# Patient Record
Sex: MALE | Race: WHITE | NOT HISPANIC OR LATINO | Employment: FULL TIME | URBAN - METROPOLITAN AREA
[De-identification: names, ages, dates, MRNs, and addresses within clinical notes are randomized per-mention and may not be internally consistent; named-entity substitution may affect disease eponyms.]

---

## 2017-04-03 ENCOUNTER — HOSPITAL ENCOUNTER (EMERGENCY)
Facility: MEDICAL CENTER | Age: 52
End: 2017-04-03
Attending: EMERGENCY MEDICINE
Payer: COMMERCIAL

## 2017-04-03 VITALS
WEIGHT: 184.3 LBS | HEIGHT: 67 IN | HEART RATE: 89 BPM | SYSTOLIC BLOOD PRESSURE: 142 MMHG | OXYGEN SATURATION: 99 % | BODY MASS INDEX: 28.93 KG/M2 | RESPIRATION RATE: 16 BRPM | TEMPERATURE: 96.8 F | DIASTOLIC BLOOD PRESSURE: 98 MMHG

## 2017-04-03 DIAGNOSIS — R04.0 EPISTAXIS: ICD-10-CM

## 2017-04-03 PROCEDURE — 99283 EMERGENCY DEPT VISIT LOW MDM: CPT

## 2017-04-03 PROCEDURE — 700102 HCHG RX REV CODE 250 W/ 637 OVERRIDE(OP): Performed by: EMERGENCY MEDICINE

## 2017-04-03 RX ORDER — OXYMETAZOLINE HYDROCHLORIDE 0.05 G/100ML
2 SPRAY NASAL ONCE
Status: COMPLETED | OUTPATIENT
Start: 2017-04-03 | End: 2017-04-03

## 2017-04-03 RX ORDER — OXYMETAZOLINE HYDROCHLORIDE 0.05 G/100ML
2 SPRAY NASAL ONCE
Status: DISCONTINUED | OUTPATIENT
Start: 2017-04-03 | End: 2017-04-03

## 2017-04-03 RX ADMIN — OXYMETAZOLINE HYDROCHLORIDE 2 SPRAY: 5 SPRAY NASAL at 02:45

## 2017-04-03 ASSESSMENT — PAIN SCALES - GENERAL: PAINLEVEL_OUTOF10: 0

## 2017-04-03 ASSESSMENT — LIFESTYLE VARIABLES: DO YOU DRINK ALCOHOL: NO

## 2017-04-03 NOTE — ED AVS SNAPSHOT
4/3/2017          Rhys Canseco  [address]  [place] [address]    Dear Rhys:    [facility] wants to ensure your discharge home is safe and you or your loved ones have had all your questions answered regarding your care after you leave the hospital.    You may receive a telephone call within two days of your discharge.  This call is to make certain you understand your discharge instructions as well as ensure we provided you with the best care possible during your stay with us.     The call will only last approximately 3-5 minutes and will be done by a nurse.    Once again, we want to ensure your discharge home is safe and that you have a clear understanding of any next steps in your care.  If you have any questions or concerns, please do not hesitate to contact us, we are here for you.  Thank you for choosing [facility] for your healthcare needs.    Sincerely,    Mamadou Batres    [facility]

## 2017-04-03 NOTE — ED NOTES
Discharge instructions given to patient, a verbal understanding of all instructions was stated.Pt preferred to walk out accompanied by wife VSS, all belongings accounted for.

## 2017-04-03 NOTE — ED PROVIDER NOTES
"CHIEF COMPLAINT  Chief Complaint   Patient presents with   • Epistaxis       HPI  Rhys Canseco is a 51 y.o. male who presents with epistaxis which started this evening. Awoke with it. Reports that he has been struggling with epistaxis for most of his life. No history of blood thinning agents. No prior surgical interventions. Denies history of trauma. Denies any vomiting. Reports that it started initially out of his right nostril down his left. Attempted hemostasis with packing using paper towels.    REVIEW OF SYSTEMS  See HPI for further details. All other systems are negative.     PAST MEDICAL HISTORY   has a past medical history of Gallstone.    SOCIAL HISTORY  Social History     Social History Main Topics   • Smoking status: Current Every Day Smoker -- 0.75 packs/day     Types: Cigarettes   • Smokeless tobacco: Never Used      Comment: 1/2 pack a day   • Alcohol Use: No   • Drug Use: Yes     Special: Inhaled      Comment: marijuana   • Sexual Activity: Not on file       SURGICAL HISTORY   has past surgical history that includes appendectomy and sonia by laparoscopy.    CURRENT MEDICATIONS  Home Medications     Reviewed by Shelia Castro R.N. (Registered Nurse) on 04/03/17 at 0214  Med List Status: Not Addressed    Medication Last Dose Status          Patient Robert Taking any Medications                        ALLERGIES  No Known Allergies    PHYSICAL EXAM  VITAL SIGNS: /96 mmHg  Pulse 88  Temp(Src) 36 °C (96.8 °F)  Resp 17  Ht 1.702 m (5' 7\")  Wt 83.6 kg (184 lb 4.9 oz)  BMI 28.86 kg/m2  SpO2 100%  Pulse ox interpretation: I interpret this pulse ox as normal.  Constitutional: Alert in no apparent distress.  HENT: No signs of trauma, Bilateral external ears normal, dried blood in bilateral nares. Patient had been applying pressure however no active bleeding at this time. Posterior pharynx without obvious blood  Eyes: Pupils are equal and reactive, Conjunctiva normal, Non-icteric.   Cardiovascular: " "Regular rate and rhythm, no murmurs.   Thorax & Lungs: No respiratory distress  Skin: Warm, Dry, No erythema, No rash.   Extremities: Intact distal pulses, No edema, No tenderness, No cyanosis  Psychiatric: Affect normal, Judgment normal, Mood normal.       DIAGNOSTIC STUDIES / PROCEDURES    COURSE & MEDICAL DECISION MAKING  Pertinent Labs & Imaging studies reviewed. (See chart for details)  51-year-old male presenting with epistaxis. Not on blood thinning agents. Has a history of epistaxis in the past. Denies any trauma. Bleeding was controlled with direct pressure and dose of oxymetazoline. Patient was instructed to follow-up with ENT as needed and also given clear instructions on how to manage epistaxis at home. He likely not adequately manage his epistaxis given that he simply plugged it with paper towels. Dry air likely contributing to his epistaxis.    The patient will not drink alcohol nor drive with prescribed medications.   The patient will return for worsening symptoms or failure of improvement and is stable at the time of discharge. The patient verbalizes understanding in their own words.    Pcp Pt States None    In 2 days      [facility], Emergency Dept  [address] [place] [address] [phone]    As needed, If symptoms worsen    Petty Rowe M.D.  [address]  [address]  [facility] [id number] [phone]      As needed for ENT evaluation    /96 mmHg  Pulse 88  Temp(Src) 36 °C (96.8 °F)  Resp 17  Ht 1.702 m (5' 7\")  Wt 83.6 kg (184 lb 4.9 oz)  BMI 28.86 kg/m2  SpO2 100%    The patient was referred to primary care where they will receive further BP management.        FINAL IMPRESSION  1. Epistaxis            Electronically signed by: Vijay Zacarias, 4/3/2017 2:19 AM      "

## 2017-04-03 NOTE — ED AVS SNAPSHOT
Home Care Instructions                                                                                                                Rhys Canseco   MRN: [id number]    Department:  [facility], Emergency Dept   Date of Visit:  4/3/2017            [facility], Emergency Dept    [address] [address]    Phone:  [phone]      You were seen by     Vijay Zacarias M.D.      Your Diagnosis Was     Epistaxis     R04.0       Follow-up Information     1. Follow up with Pcp Pt States None In 2 days.    Specialty:  Family Medicine        2. Follow up with [facility], Emergency Dept.    Specialty:  Emergency Medicine    Why:  As needed, If symptoms worsen    Contact information    [address] [facility] [address] [phone]        3. Follow up with Petty Rowe M.D..    Specialty:  Otolaryngology    Why:  As needed for ENT evaluation    Contact information    [address]  [address]  [facility] [address] [phone]        Medication Information     Review all of your home medications and newly ordered medications with your primary doctor and/or pharmacist as soon as possible. Follow medication instructions as directed by your doctor and/or pharmacist.     Please keep your complete medication list with you and share with your physician. Update the information when medications are discontinued, doses are changed, or new medications (including over-the-counter products) are added; and carry medication information at all times in the event of emergency situations.               Medication List      Notice     You have not been prescribed any medications.              Discharge Instructions       Nosebleed  Nosebleeds are common. They are due to a crack in the inside lining of your nose (mucous membrane) or from a small blood vessel that starts to bleed. Nosebleeds can be caused by many conditions, such as injury, infections, dry mucous membranes or  dry climate, medicines, nose picking, and home heating and cooling systems. Most nosebleeds come from blood vessels in the front of your nose.  HOME CARE INSTRUCTIONS   · Try controlling your nosebleed by pinching your nostrils gently and continuously for at least 10 minutes.  · Avoid blowing or sniffing your nose for a number of hours after having a nosebleed.  · Do not put gauze inside your nose yourself. If your nose was packed by your health care provider, try to maintain the pack inside of your nose until your health care provider removes it.  ¨ If a gauze pack was used and it starts to fall out, gently replace it or cut off the end of it.  ¨ If a balloon catheter was used to pack your nose, do not cut or remove it unless your health care provider has instructed you to do that.  · Avoid lying down while you are having a nosebleed. Sit up and lean forward.  · Use a nasal spray decongestant to help with a nosebleed as directed by your health care provider.  · Do not use petroleum jelly or mineral oil in your nose. These can drip into your lungs.  · Maintain humidity in your home by using less air conditioning or by using a humidifier.  · Aspirin and blood thinners make bleeding more likely. If you are prescribed these medicines and you suffer from nosebleeds, ask your health care provider if you should stop taking the medicines or adjust the dose. Do not stop medicines unless directed by your health care provider  · Resume your normal activities as you are able, but avoid straining, lifting, or bending at the waist for several days.  · If your nosebleed was caused by dry mucous membranes, use over-the-counter saline nasal spray or gel. This will keep the mucous membranes moist and allow them to heal. If you must use a lubricant, choose the water-soluble variety. Use it only sparingly, and do not use it within several hours of lying down.  · Keep all follow-up visits as directed by your health care provider. This  is important.  SEEK MEDICAL CARE IF:  · You have a fever.  · You get frequent nosebleeds.  · You are getting nosebleeds more often.  SEEK IMMEDIATE MEDICAL CARE IF:  · Your nosebleed lasts longer than 20 minutes.  · Your nosebleed occurs after an injury to your face, and your nose looks crooked or broken.  · You have unusual bleeding from other parts of your body.  · You have unusual bruising on other parts of your body.  · You feel light-headed or you faint.  · You become sweaty.  · You vomit blood.  · Your nosebleed occurs after a head injury.     This information is not intended to replace advice given to you by your health care provider. Make sure you discuss any questions you have with your health care provider.     Document Released: 09/27/2006 Document Revised: 01/08/2016 Document Reviewed: 08/03/2015  [employer] Interactive Patient Education ©2016 [employer]            Patient Information     Patient Information    Following emergency treatment: all patient requiring follow-up care must return either to a private physician or a clinic if your condition worsens before you are able to obtain further medical attention, please return to the emergency room.     Billing Information    At [facility], we work to make the billing process streamlined for our patients.  Our Representatives are here to answer any questions you may have regarding your hospital bill.  If you have insurance coverage and have supplied your insurance information to us, we will submit a claim to your insurer on your behalf.  Should you have any questions regarding your bill, we can be reached online or by phone as follows:  Online: You are able pay your bills online or live chat with our representatives about any billing questions you may have. We are here to help Monday - Friday from 8:00am to 7:30pm and 9:00am - 12:00pm on Saturdays.  Please visit [email or web address]  for more information.   Phone:   [phone] or [phone]    Please note that your emergency physician, surgeon, pathologist, radiologist, anesthesiologist, and other specialists are not employed by [facility] and will therefore bill separately for their services.  Please contact them directly for any questions concerning their bills at the numbers below:     Emergency Physician Services:  [phone]  [facility]:  [phone]  [facility]:  [phone]  [facility]:  [phone]    1. Your final bill may vary from the amount quoted upon discharge if all procedures are not complete at that time, or if your doctor has additional procedures of which we are not aware. You will receive an additional bill if you return to the Emergency Department at [facility] for suture removal regardless of the facility of which the sutures were placed.     2. Please arrange for settlement of this account at the emergency registration.    3. All self-pay accounts are due in full at the time of treatment.  If you are unable to meet this obligation then payment is expected within 4-5 days.     4. If you have had radiology studies (CT, X-ray, Ultrasound, MRI), you have received a preliminary result during your emergency department visit. Please contact the radiology department [phone] to receive a copy of your final result. Please discuss the Final result with your primary physician or with the follow up physician provided.     Crisis Hotline:  [place] Crisis Hotline:  7-613-CBVIPDN or 1-724.260.8080  [place] Crisis Hotline:    1-551.922.5066 or 281-106-2573         ED Discharge Follow Up Questions    1. In order to provide you with very good care, we would like to follow up with a phone call in the next few days.  May we have your permission to contact you?     YES /  NO    2. What is the best phone number to call you? (       )_____-__________    3. What is the best time to call you?      Morning  /   Afternoon  /  Evening                   Patient Signature:  ____________________________________________________________    Date:  ____________________________________________________________

## 2017-04-03 NOTE — DISCHARGE INSTRUCTIONS
Nosebleed  Nosebleeds are common. They are due to a crack in the inside lining of your nose (mucous membrane) or from a small blood vessel that starts to bleed. Nosebleeds can be caused by many conditions, such as injury, infections, dry mucous membranes or dry climate, medicines, nose picking, and home heating and cooling systems. Most nosebleeds come from blood vessels in the front of your nose.  HOME CARE INSTRUCTIONS   · Try controlling your nosebleed by pinching your nostrils gently and continuously for at least 10 minutes.  · Avoid blowing or sniffing your nose for a number of hours after having a nosebleed.  · Do not put gauze inside your nose yourself. If your nose was packed by your health care provider, try to maintain the pack inside of your nose until your health care provider removes it.  ¨ If a gauze pack was used and it starts to fall out, gently replace it or cut off the end of it.  ¨ If a balloon catheter was used to pack your nose, do not cut or remove it unless your health care provider has instructed you to do that.  · Avoid lying down while you are having a nosebleed. Sit up and lean forward.  · Use a nasal spray decongestant to help with a nosebleed as directed by your health care provider.  · Do not use petroleum jelly or mineral oil in your nose. These can drip into your lungs.  · Maintain humidity in your home by using less air conditioning or by using a humidifier.  · Aspirin and blood thinners make bleeding more likely. If you are prescribed these medicines and you suffer from nosebleeds, ask your health care provider if you should stop taking the medicines or adjust the dose. Do not stop medicines unless directed by your health care provider  · Resume your normal activities as you are able, but avoid straining, lifting, or bending at the waist for several days.  · If your nosebleed was caused by dry mucous membranes, use over-the-counter saline nasal spray or gel. This will keep the  mucous membranes moist and allow them to heal. If you must use a lubricant, choose the water-soluble variety. Use it only sparingly, and do not use it within several hours of lying down.  · Keep all follow-up visits as directed by your health care provider. This is important.  SEEK MEDICAL CARE IF:  · You have a fever.  · You get frequent nosebleeds.  · You are getting nosebleeds more often.  SEEK IMMEDIATE MEDICAL CARE IF:  · Your nosebleed lasts longer than 20 minutes.  · Your nosebleed occurs after an injury to your face, and your nose looks crooked or broken.  · You have unusual bleeding from other parts of your body.  · You have unusual bruising on other parts of your body.  · You feel light-headed or you faint.  · You become sweaty.  · You vomit blood.  · Your nosebleed occurs after a head injury.     This information is not intended to replace advice given to you by your health care provider. Make sure you discuss any questions you have with your health care provider.     Document Released: 09/27/2006 Document Revised: 01/08/2016 Document Reviewed: 08/03/2015  "[employer]" Interactive Patient Education ©2016 "[employer] Inc.

## 2017-04-03 NOTE — ED NOTES
Patient to ED triage with complaint of nose bleed x30 minutes. States he has not been able to control bleeding at home. Nasal clamp applied and he continues to have oozing. Denies anticoagulant use. No home 02.     Pt educated on ED process and asked to wait in lobby. Patient educated on importance of alerting staff to new or worsening symptoms or concerns.

## 2017-04-03 NOTE — ED NOTES
"Pt's nose has stopped bleeding at this time. Pt. Has scant drainage left. Pt. Provided nose clamps to take home and emesis bag to cough up what he states is \"a bunch of blood in the back of his throat.\"  "

## 2017-04-03 NOTE — ED AVS SNAPSHOT
BioMed[employer] Access Code: [email or web address]  Expires: 5/3/2017  3:33 AM    BioMedFlex  A secure, online tool to manage your health information     [employer]’s BioMedFlex® is a secure, online tool that connects you to your personalized health information from the privacy of your home -- day or night - making it very easy for you to manage your healthcare. Once the activation process is completed, you can even access your medical information using the BioMedFlex jennifer, which is available for free in the Apple Jennifer store or Google Play store.     BioMedFlex provides the following levels of access (as shown below):   My Chart Features   [facility] Primary Care Doctor [facility]  Specialists [facility]  Urgent  Care Non-[facility]  Primary Care  Doctor   Email your healthcare team securely and privately 24/7 X X X X   Manage appointments: schedule your next appointment; view details of past/upcoming appointments X      Request prescription refills. X      View recent personal medical records, including lab and immunizations X X X X   View health record, including health history, allergies, medications X X X X   Read reports about your outpatient visits, procedures, consult and ER notes X X X X   See your discharge summary, which is a recap of your hospital and/or ER visit that includes your diagnosis, lab results, and care plan. X X       How to register for BioMedFlex:  1. Go to  [email or web address].  2. Click on the Sign Up Now box, which takes you to the New Member Sign Up page. You will need to provide the following information:  a. Enter your BioMedFlex Access Code exactly as it appears at the top of this page. (You will not need to use this code after you’ve completed the sign-up process. If you do not sign up before the expiration date, you must request a new code.)   b. Enter your date of birth.   c. Enter your home email address.   d. Click Submit, and follow the next screen’s instructions.  3. Create a BioMedFlex ID. This will be your [employer]  login ID and cannot be changed, so think of one that is secure and easy to remember.  4. Create a [employer] password. You can change your password at any time.  5. Enter your Password Reset Question and Answer. This can be used at a later time if you forget your password.   6. Enter your e-mail address. This allows you to receive e-mail notifications when new information is available in [employer].  7. Click Sign Up. You can now view your health information.    For assistance activating your [employer] account, call [phone]

## 2019-06-26 ENCOUNTER — HOSPITAL ENCOUNTER (EMERGENCY)
Facility: MEDICAL CENTER | Age: 54
End: 2019-06-26
Attending: EMERGENCY MEDICINE

## 2019-06-26 ENCOUNTER — APPOINTMENT (OUTPATIENT)
Dept: RADIOLOGY | Facility: MEDICAL CENTER | Age: 54
End: 2019-06-26
Attending: EMERGENCY MEDICINE

## 2019-06-26 VITALS
WEIGHT: 190.04 LBS | HEART RATE: 68 BPM | RESPIRATION RATE: 18 BRPM | HEIGHT: 67 IN | SYSTOLIC BLOOD PRESSURE: 109 MMHG | BODY MASS INDEX: 29.83 KG/M2 | TEMPERATURE: 96.7 F | DIASTOLIC BLOOD PRESSURE: 63 MMHG | OXYGEN SATURATION: 99 %

## 2019-06-26 DIAGNOSIS — R07.89 ATYPICAL CHEST PAIN: ICD-10-CM

## 2019-06-26 LAB
ALBUMIN SERPL BCP-MCNC: 4.4 G/DL (ref 3.2–4.9)
ALBUMIN/GLOB SERPL: 1.8 G/DL
ALP SERPL-CCNC: 64 U/L (ref 30–99)
ALT SERPL-CCNC: 27 U/L (ref 2–50)
ANION GAP SERPL CALC-SCNC: 16 MMOL/L (ref 0–11.9)
AST SERPL-CCNC: 20 U/L (ref 12–45)
BASOPHILS # BLD AUTO: 0.9 % (ref 0–1.8)
BASOPHILS # BLD: 0.09 K/UL (ref 0–0.12)
BILIRUB SERPL-MCNC: 0.3 MG/DL (ref 0.1–1.5)
BUN SERPL-MCNC: 20 MG/DL (ref 8–22)
CALCIUM SERPL-MCNC: 9.5 MG/DL (ref 8.5–10.5)
CHLORIDE SERPL-SCNC: 104 MMOL/L (ref 96–112)
CO2 SERPL-SCNC: 20 MMOL/L (ref 20–33)
CREAT SERPL-MCNC: 1.07 MG/DL (ref 0.5–1.4)
EKG IMPRESSION: NORMAL
EOSINOPHIL # BLD AUTO: 0.49 K/UL (ref 0–0.51)
EOSINOPHIL NFR BLD: 4.8 % (ref 0–6.9)
ERYTHROCYTE [DISTWIDTH] IN BLOOD BY AUTOMATED COUNT: 39.5 FL (ref 35.9–50)
GLOBULIN SER CALC-MCNC: 2.5 G/DL (ref 1.9–3.5)
GLUCOSE SERPL-MCNC: 110 MG/DL (ref 65–99)
HCT VFR BLD AUTO: 48.3 % (ref 42–52)
HGB BLD-MCNC: 16.7 G/DL (ref 14–18)
IMM GRANULOCYTES # BLD AUTO: 0.04 K/UL (ref 0–0.11)
IMM GRANULOCYTES NFR BLD AUTO: 0.4 % (ref 0–0.9)
LYMPHOCYTES # BLD AUTO: 4.21 K/UL (ref 1–4.8)
LYMPHOCYTES NFR BLD: 41.1 % (ref 22–41)
MCH RBC QN AUTO: 30.8 PG (ref 27–33)
MCHC RBC AUTO-ENTMCNC: 34.6 G/DL (ref 33.7–35.3)
MCV RBC AUTO: 89.1 FL (ref 81.4–97.8)
MONOCYTES # BLD AUTO: 0.94 K/UL (ref 0–0.85)
MONOCYTES NFR BLD AUTO: 9.2 % (ref 0–13.4)
NEUTROPHILS # BLD AUTO: 4.48 K/UL (ref 1.82–7.42)
NEUTROPHILS NFR BLD: 43.6 % (ref 44–72)
NRBC # BLD AUTO: 0 K/UL
NRBC BLD-RTO: 0 /100 WBC
PLATELET # BLD AUTO: 261 K/UL (ref 164–446)
PMV BLD AUTO: 10.9 FL (ref 9–12.9)
POTASSIUM SERPL-SCNC: 3.6 MMOL/L (ref 3.6–5.5)
PROT SERPL-MCNC: 6.9 G/DL (ref 6–8.2)
RBC # BLD AUTO: 5.42 M/UL (ref 4.7–6.1)
SODIUM SERPL-SCNC: 140 MMOL/L (ref 135–145)
TROPONIN I SERPL-MCNC: <0.01 NG/ML (ref 0–0.04)
WBC # BLD AUTO: 10.3 K/UL (ref 4.8–10.8)

## 2019-06-26 PROCEDURE — 84484 ASSAY OF TROPONIN QUANT: CPT

## 2019-06-26 PROCEDURE — 93005 ELECTROCARDIOGRAM TRACING: CPT

## 2019-06-26 PROCEDURE — 71045 X-RAY EXAM CHEST 1 VIEW: CPT

## 2019-06-26 PROCEDURE — 85025 COMPLETE CBC W/AUTO DIFF WBC: CPT

## 2019-06-26 PROCEDURE — 304561 HCHG STAT O2

## 2019-06-26 PROCEDURE — 99284 EMERGENCY DEPT VISIT MOD MDM: CPT

## 2019-06-26 PROCEDURE — 36415 COLL VENOUS BLD VENIPUNCTURE: CPT

## 2019-06-26 PROCEDURE — 700111 HCHG RX REV CODE 636 W/ 250 OVERRIDE (IP): Performed by: EMERGENCY MEDICINE

## 2019-06-26 PROCEDURE — 96374 THER/PROPH/DIAG INJ IV PUSH: CPT

## 2019-06-26 PROCEDURE — 80053 COMPREHEN METABOLIC PANEL: CPT

## 2019-06-26 PROCEDURE — 93005 ELECTROCARDIOGRAM TRACING: CPT | Performed by: EMERGENCY MEDICINE

## 2019-06-26 RX ORDER — LORAZEPAM 2 MG/ML
0.5 INJECTION INTRAMUSCULAR ONCE
Status: COMPLETED | OUTPATIENT
Start: 2019-06-26 | End: 2019-06-26

## 2019-06-26 RX ADMIN — LORAZEPAM 0.5 MG: 2 INJECTION INTRAMUSCULAR; INTRAVENOUS at 08:32

## 2019-06-26 ASSESSMENT — PAIN DESCRIPTION - DESCRIPTORS: DESCRIPTORS: SHARP

## 2019-06-26 NOTE — ED NOTES
Pt brought back to rm GR 25 from triage by WC. Agree with triage note, pt instructed to slow his breathing. Pt able to transfer self to bed, pt in gown, on monitor, PIV line established. Blood drawn and sent to the lab. Call light in reach. Chart up for ERP.

## 2019-06-26 NOTE — ED TRIAGE NOTES
".  Chief Complaint   Patient presents with   • Chest Pain     ./84   Pulse 92   Temp 35.9 °C (96.7 °F) (Temporal)   Resp (!) 24 Comment: Pt coached to slow down his breathing.   Ht 1.702 m (5' 7\")   Wt 86.2 kg (190 lb 0.6 oz)   SpO2 100%   BMI 29.76 kg/m²     Ambulatory to triage with above complaints, patient woke up with 8/10 sharp chest pain radiating to back approximately 1 hour ago, SOB, EKG done, educated on triage process, placed in lobby, told to inform staff of any changes in condition.    "

## 2019-06-26 NOTE — ED PROVIDER NOTES
"ED Provider Note    Scribed for Jamar Winter M.D. by Jamar Winter. 6/26/2019,  8:17 AM.    CHIEF COMPLAINT  Chief Complaint   Patient presents with   • Chest Pain       HPI  Rhys Canseco is an intensely anxious 53 y.o. male who presents to the Emergency Department complaining of 2 hours of sharp left-sided chest pain, which is now faded to a dull discomfort.  On arrival, he was extremely anxious and hyperventilating, and had to be coached through what appeared to be panic attack symptoms at triage.  His symptoms improved with breath coaching, though as we talked, he becomes tachypneic again, and his anxiety seems to escalate.  He is squinting his eyes closed, not making eye contact, and is a somewhat limited historian.  He denies any history of cardiopulmonary disease, other than to tell me \"I had pneumonia once.\"  He is a daily smoker.  He has not had nausea or vomiting or diaphoresis or exertional chest pain orthopnea.  He has not had any recent illness, including fevers or chills or cough.        REVIEW OF SYSTEMS  See HPI for further details. All other systems are negative.     PAST MEDICAL HISTORY   has a past medical history of Gallstone.    SOCIAL HISTORY  Social History     Social History Main Topics   • Smoking status: Current Every Day Smoker     Packs/day: 0.75     Types: Cigarettes   • Smokeless tobacco: Never Used      Comment: 1/2 pack a day   • Alcohol use No   • Drug use: Yes     Types: Inhaled      Comment: marijuana   • Sexual activity: Not on file     History   Drug Use   • Types: Inhaled     Comment: marijuana       SURGICAL HISTORY   has a past surgical history that includes appendectomy and sonia by laparoscopy.    CURRENT MEDICATIONS  Home Medications     Reviewed by Sasha Gomez R.N. (Registered Nurse) on 06/26/19 at 9265  Med List Status: Complete   Medication Last Dose Status        Patient Robert Taking any Medications                       ALLERGIES  Allergies " "  Allergen Reactions   • Bee        PHYSICAL EXAM  VITAL SIGNS: /63   Pulse 68   Temp 35.9 °C (96.7 °F) (Temporal)   Resp 18   Ht 1.702 m (5' 7\")   Wt 86.2 kg (190 lb 0.6 oz)   SpO2 99%   BMI 29.76 kg/m²   Pulse ox interpretation: I interpret this pulse ox as normal.  Constitutional: Alert in moderate distress from anxiety.  HENT: No signs of trauma, Bilateral external ears normal, Nose normal.   Eyes: Conjunctiva normal, Non-icteric.   Neck: Normal range of motion, Supple, No stridor.   Lymphatic: No lymphadenopathy noted.   Cardiovascular: Regular rate and rhythm, no murmurs.   Thorax & Lungs: Normal breath sounds, No respiratory distress, No wheezing, No chest tenderness.   Abdomen: Bowel sounds normal, Soft, No tenderness, No masses, No pulsatile masses. No peritoneal signs.  Skin: Warm, Dry, No erythema, No rash.   Extremities: Intact distal pulses, No edema, No cyanosis.  Musculoskeletal: Good range of motion in all major joints. No or major deformities noted.   Neurologic: Alert , Normal motor function, Normal sensory function, No focal deficits noted.   Psychiatric: Affect anxious and unusual, Judgment normal, Mood normal.     DIAGNOSTIC STUDIES / PROCEDURES    EKG  Interpreted by me    SINUS RHYTHM  LEFT AXIS DEVIATION  nonspecific t-wave flattening, DIFFUSE LEADS  Compared to ECG 01/03/2008 11:10:10  Left-axis deviation now present  T-wave abnormality now present  Sinus bradycardia no longer present    LABS  Labs Reviewed   CBC WITH DIFFERENTIAL - Abnormal; Notable for the following:        Result Value    Neutrophils-Polys 43.60 (*)     Lymphocytes 41.10 (*)     Monos (Absolute) 0.94 (*)     All other components within normal limits   COMP METABOLIC PANEL - Abnormal; Notable for the following:     Anion Gap 16.0 (*)     Glucose 110 (*)     All other components within normal limits   TROPONIN   ESTIMATED GFR     All labs reviewed by me.    RADIOLOGY  DX-CHEST-PORTABLE (1 VIEW)   Final Result "      Minimal lung base atelectasis. Mild edema not excluded.        The radiologist's interpretation of all radiological studies have been reviewed by me.    COURSE & MEDICAL DECISION MAKING  Nursing notes, GEOVANI GOFFHx reviewed in chart.     8:17 AM Patient seen and examined at bedside. Differential diagnosis includes but is not limited to acute coronary syndrome, anxiety, pneumonia.  Considered aortic dissection, but it does not fit his symptoms or presentation.  Ordered for screening laboratory tests, and chest x-ray to evaluate. Patient will be treated with Ativan for his symptoms.     This patient's labs, including troponin, are unremarkable.  His EKG and chest x-ray were also reassuring, without evidence of ischemia or infection.  His tachypnea resolved completely with Ativan, and he was calm her.  I think that he was experiencing anxiety/panic, much more likely than any cardiovascular or pulmonary disease.  He is safe and appropriate for discharge to primary care, with return precautions per     The patient will return for new or worsening symptoms and is stable at the time of discharge.    The patient is referred to a primary physician for blood pressure management, diabetic screening, and for all other preventative health concerns.      DISPOSITION:  Patient will be discharged home in stable condition.    FOLLOW UP:  [address] [id number] [phone]    for primary care    [facility] [place] [id number] [facility] [address] [phone]    for primary care    [address] [address]    for primary care      OUTPATIENT MEDICATIONS:  New Prescriptions    No medications on file         FINAL IMPRESSION  1. Atypical chest pain

## 2019-06-26 NOTE — DISCHARGE INSTRUCTIONS
Your EKG and chest x-ray and laboratory tests here were very reassuring.  There is no evidence of a dangerous cause of your symptoms.  Please follow-up with your primary care doctor.  If you do not have a primary care doctor, use 1 of the clinics listed above to establish a primary care provider.

## 2020-01-16 ENCOUNTER — OFFICE VISIT (OUTPATIENT)
Dept: URGENT CARE | Facility: CLINIC | Age: 55
End: 2020-01-16
Payer: COMMERCIAL

## 2020-01-16 VITALS
SYSTOLIC BLOOD PRESSURE: 118 MMHG | RESPIRATION RATE: 16 BRPM | HEART RATE: 92 BPM | OXYGEN SATURATION: 96 % | DIASTOLIC BLOOD PRESSURE: 74 MMHG | TEMPERATURE: 98.8 F

## 2020-01-16 DIAGNOSIS — F17.200 SMOKER: ICD-10-CM

## 2020-01-16 DIAGNOSIS — Z02.89 ENCOUNTER TO OBTAIN EXCUSE FROM WORK: ICD-10-CM

## 2020-01-16 DIAGNOSIS — S20.212A CHEST WALL CONTUSION, LEFT, INITIAL ENCOUNTER: ICD-10-CM

## 2020-01-16 PROCEDURE — 99406 BEHAV CHNG SMOKING 3-10 MIN: CPT | Performed by: PHYSICIAN ASSISTANT

## 2020-01-16 PROCEDURE — 99203 OFFICE O/P NEW LOW 30 MIN: CPT | Mod: 25 | Performed by: PHYSICIAN ASSISTANT

## 2020-01-16 ASSESSMENT — ENCOUNTER SYMPTOMS
HEMOPTYSIS: 0
COUGH: 0
LOSS OF CONSCIOUSNESS: 0
FEVER: 0
SHORTNESS OF BREATH: 0
SPUTUM PRODUCTION: 0

## 2020-01-16 NOTE — LETTER
January 16, 2020         Patient: Rhys Canseco   YOB: 1965   Date of Visit: 1/16/2020           To Whom it May Concern:    Rhys Canseco was seen in my clinic on 1/16/2020. He may return to work on 1/17/2020 at full duty with no restrictions..    If you have any questions or concerns, please don't hesitate to call.        Sincerely,           Meseret Castro P.A.-C.  Electronically Signed      Patient reports "Shortness of breath and feet swelling for a couple of hours." Denies chest pain. patient reports arm pain
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
## 2021-03-15 DIAGNOSIS — Z23 NEED FOR VACCINATION: ICD-10-CM

## 2024-04-13 ENCOUNTER — HOSPITAL ENCOUNTER (OUTPATIENT)
Facility: MEDICAL CENTER | Age: 59
End: 2024-04-13
Attending: EMERGENCY MEDICINE | Admitting: INTERNAL MEDICINE
Payer: COMMERCIAL

## 2024-04-13 ENCOUNTER — APPOINTMENT (OUTPATIENT)
Dept: RADIOLOGY | Facility: MEDICAL CENTER | Age: 59
End: 2024-04-13
Attending: EMERGENCY MEDICINE
Payer: COMMERCIAL

## 2024-04-13 ENCOUNTER — APPOINTMENT (OUTPATIENT)
Dept: RADIOLOGY | Facility: MEDICAL CENTER | Age: 59
End: 2024-04-13
Payer: COMMERCIAL

## 2024-04-13 ENCOUNTER — APPOINTMENT (OUTPATIENT)
Dept: CARDIOLOGY | Facility: MEDICAL CENTER | Age: 59
End: 2024-04-13
Attending: INTERNAL MEDICINE
Payer: COMMERCIAL

## 2024-04-13 VITALS
HEIGHT: 67 IN | OXYGEN SATURATION: 97 % | HEART RATE: 95 BPM | RESPIRATION RATE: 17 BRPM | TEMPERATURE: 98.3 F | WEIGHT: 183.64 LBS | DIASTOLIC BLOOD PRESSURE: 58 MMHG | SYSTOLIC BLOOD PRESSURE: 88 MMHG | BODY MASS INDEX: 28.82 KG/M2

## 2024-04-13 DIAGNOSIS — M79.604 PAIN IN BOTH LOWER EXTREMITIES: ICD-10-CM

## 2024-04-13 DIAGNOSIS — M79.605 PAIN IN BOTH LOWER EXTREMITIES: ICD-10-CM

## 2024-04-13 DIAGNOSIS — R07.9 CHEST PAIN, UNSPECIFIED TYPE: ICD-10-CM

## 2024-04-13 PROBLEM — E87.1 HYPONATREMIA: Status: ACTIVE | Noted: 2024-04-13

## 2024-04-13 PROBLEM — F15.10 METHAMPHETAMINE USE (HCC): Status: ACTIVE | Noted: 2024-04-13

## 2024-04-13 PROBLEM — F17.200 TOBACCO DEPENDENCE: Status: ACTIVE | Noted: 2024-04-13

## 2024-04-13 LAB
ALBUMIN SERPL BCP-MCNC: 4 G/DL (ref 3.2–4.9)
ALBUMIN/GLOB SERPL: 1.1 G/DL
ALP SERPL-CCNC: 124 U/L (ref 30–99)
ALT SERPL-CCNC: 30 U/L (ref 2–50)
AMPHET UR QL SCN: POSITIVE
ANION GAP SERPL CALC-SCNC: 11 MMOL/L (ref 7–16)
AST SERPL-CCNC: 22 U/L (ref 12–45)
BARBITURATES UR QL SCN: NEGATIVE
BASOPHILS # BLD AUTO: 0.7 % (ref 0–1.8)
BASOPHILS # BLD: 0.05 K/UL (ref 0–0.12)
BENZODIAZ UR QL SCN: NEGATIVE
BILIRUB SERPL-MCNC: 0.5 MG/DL (ref 0.1–1.5)
BUN SERPL-MCNC: 11 MG/DL (ref 8–22)
BZE UR QL SCN: NEGATIVE
CALCIUM ALBUM COR SERPL-MCNC: 8.9 MG/DL (ref 8.5–10.5)
CALCIUM SERPL-MCNC: 8.9 MG/DL (ref 8.5–10.5)
CANNABINOIDS UR QL SCN: POSITIVE
CHLORIDE SERPL-SCNC: 100 MMOL/L (ref 96–112)
CHOLEST SERPL-MCNC: 126 MG/DL (ref 100–199)
CO2 SERPL-SCNC: 22 MMOL/L (ref 20–33)
CREAT SERPL-MCNC: 1.12 MG/DL (ref 0.5–1.4)
D DIMER PPP IA.FEU-MCNC: 0.33 UG/ML (FEU) (ref 0–0.5)
EKG IMPRESSION: NORMAL
EKG IMPRESSION: NORMAL
EOSINOPHIL # BLD AUTO: 0.28 K/UL (ref 0–0.51)
EOSINOPHIL NFR BLD: 3.8 % (ref 0–6.9)
ERYTHROCYTE [DISTWIDTH] IN BLOOD BY AUTOMATED COUNT: 44.2 FL (ref 35.9–50)
EST. AVERAGE GLUCOSE BLD GHB EST-MCNC: 114 MG/DL
FENTANYL UR QL: NEGATIVE
GFR SERPLBLD CREATININE-BSD FMLA CKD-EPI: 76 ML/MIN/1.73 M 2
GLOBULIN SER CALC-MCNC: 3.5 G/DL (ref 1.9–3.5)
GLUCOSE SERPL-MCNC: 102 MG/DL (ref 65–99)
HBA1C MFR BLD: 5.6 % (ref 4–5.6)
HCT VFR BLD AUTO: 49.8 % (ref 42–52)
HDLC SERPL-MCNC: 12 MG/DL
HGB BLD-MCNC: 16.5 G/DL (ref 14–18)
IMM GRANULOCYTES # BLD AUTO: 0.04 K/UL (ref 0–0.11)
IMM GRANULOCYTES NFR BLD AUTO: 0.5 % (ref 0–0.9)
LDLC SERPL CALC-MCNC: 65 MG/DL
LV EJECT FRACT  99904: 55
LV EJECT FRACT MOD 2C 99903: 59.59
LV EJECT FRACT MOD 4C 99902: 46.26
LV EJECT FRACT MOD BP 99901: 51.41
LYMPHOCYTES # BLD AUTO: 1.36 K/UL (ref 1–4.8)
LYMPHOCYTES NFR BLD: 18.7 % (ref 22–41)
MAGNESIUM SERPL-MCNC: 2.1 MG/DL (ref 1.5–2.5)
MCH RBC QN AUTO: 30.6 PG (ref 27–33)
MCHC RBC AUTO-ENTMCNC: 33.1 G/DL (ref 32.3–36.5)
MCV RBC AUTO: 92.4 FL (ref 81.4–97.8)
METHADONE UR QL SCN: NEGATIVE
MONOCYTES # BLD AUTO: 0.86 K/UL (ref 0–0.85)
MONOCYTES NFR BLD AUTO: 11.8 % (ref 0–13.4)
NEUTROPHILS # BLD AUTO: 4.69 K/UL (ref 1.82–7.42)
NEUTROPHILS NFR BLD: 64.5 % (ref 44–72)
NRBC # BLD AUTO: 0 K/UL
NRBC BLD-RTO: 0 /100 WBC (ref 0–0.2)
NT-PROBNP SERPL IA-MCNC: 52 PG/ML (ref 0–125)
OPIATES UR QL SCN: NEGATIVE
OXYCODONE UR QL SCN: NEGATIVE
PCP UR QL SCN: NEGATIVE
PLATELET # BLD AUTO: 231 K/UL (ref 164–446)
PMV BLD AUTO: 9.7 FL (ref 9–12.9)
POTASSIUM SERPL-SCNC: 4.4 MMOL/L (ref 3.6–5.5)
PROPOXYPH UR QL SCN: NEGATIVE
PROT SERPL-MCNC: 7.5 G/DL (ref 6–8.2)
RBC # BLD AUTO: 5.39 M/UL (ref 4.7–6.1)
SODIUM SERPL-SCNC: 133 MMOL/L (ref 135–145)
TRIGL SERPL-MCNC: 244 MG/DL (ref 0–149)
TROPONIN T SERPL-MCNC: 6 NG/L (ref 6–19)
TROPONIN T SERPL-MCNC: 7 NG/L (ref 6–19)
TROPONIN T SERPL-MCNC: 8 NG/L (ref 6–19)
WBC # BLD AUTO: 7.3 K/UL (ref 4.8–10.8)

## 2024-04-13 PROCEDURE — 700105 HCHG RX REV CODE 258: Performed by: INTERNAL MEDICINE

## 2024-04-13 PROCEDURE — 83880 ASSAY OF NATRIURETIC PEPTIDE: CPT

## 2024-04-13 PROCEDURE — G0378 HOSPITAL OBSERVATION PER HR: HCPCS

## 2024-04-13 PROCEDURE — 93306 TTE W/DOPPLER COMPLETE: CPT

## 2024-04-13 PROCEDURE — 99223 1ST HOSP IP/OBS HIGH 75: CPT | Mod: 25 | Performed by: INTERNAL MEDICINE

## 2024-04-13 PROCEDURE — 80307 DRUG TEST PRSMV CHEM ANLYZR: CPT

## 2024-04-13 PROCEDURE — 36415 COLL VENOUS BLD VENIPUNCTURE: CPT

## 2024-04-13 PROCEDURE — 93970 EXTREMITY STUDY: CPT | Mod: 26 | Performed by: INTERNAL MEDICINE

## 2024-04-13 PROCEDURE — 93005 ELECTROCARDIOGRAM TRACING: CPT

## 2024-04-13 PROCEDURE — 99285 EMERGENCY DEPT VISIT HI MDM: CPT

## 2024-04-13 PROCEDURE — 99407 BEHAV CHNG SMOKING > 10 MIN: CPT | Performed by: INTERNAL MEDICINE

## 2024-04-13 PROCEDURE — 93970 EXTREMITY STUDY: CPT

## 2024-04-13 PROCEDURE — 83735 ASSAY OF MAGNESIUM: CPT

## 2024-04-13 PROCEDURE — 80061 LIPID PANEL: CPT

## 2024-04-13 PROCEDURE — 85025 COMPLETE CBC W/AUTO DIFF WBC: CPT

## 2024-04-13 PROCEDURE — 93005 ELECTROCARDIOGRAM TRACING: CPT | Performed by: INTERNAL MEDICINE

## 2024-04-13 PROCEDURE — 700102 HCHG RX REV CODE 250 W/ 637 OVERRIDE(OP): Performed by: INTERNAL MEDICINE

## 2024-04-13 PROCEDURE — 83036 HEMOGLOBIN GLYCOSYLATED A1C: CPT

## 2024-04-13 PROCEDURE — 71045 X-RAY EXAM CHEST 1 VIEW: CPT

## 2024-04-13 PROCEDURE — 85379 FIBRIN DEGRADATION QUANT: CPT

## 2024-04-13 PROCEDURE — 80053 COMPREHEN METABOLIC PANEL: CPT

## 2024-04-13 PROCEDURE — 99407 BEHAV CHNG SMOKING > 10 MIN: CPT

## 2024-04-13 PROCEDURE — 93005 ELECTROCARDIOGRAM TRACING: CPT | Performed by: EMERGENCY MEDICINE

## 2024-04-13 PROCEDURE — 93306 TTE W/DOPPLER COMPLETE: CPT | Mod: 26 | Performed by: INTERNAL MEDICINE

## 2024-04-13 PROCEDURE — 84484 ASSAY OF TROPONIN QUANT: CPT

## 2024-04-13 PROCEDURE — A9270 NON-COVERED ITEM OR SERVICE: HCPCS | Performed by: INTERNAL MEDICINE

## 2024-04-13 RX ORDER — ENOXAPARIN SODIUM 100 MG/ML
40 INJECTION SUBCUTANEOUS DAILY
Status: DISCONTINUED | OUTPATIENT
Start: 2024-04-13 | End: 2024-04-13 | Stop reason: HOSPADM

## 2024-04-13 RX ORDER — ONDANSETRON 4 MG/1
4 TABLET, ORALLY DISINTEGRATING ORAL EVERY 4 HOURS PRN
Status: DISCONTINUED | OUTPATIENT
Start: 2024-04-13 | End: 2024-04-13 | Stop reason: HOSPADM

## 2024-04-13 RX ORDER — OXYCODONE HYDROCHLORIDE 5 MG/1
5 TABLET ORAL
Status: DISCONTINUED | OUTPATIENT
Start: 2024-04-13 | End: 2024-04-13 | Stop reason: HOSPADM

## 2024-04-13 RX ORDER — ACETAMINOPHEN 325 MG/1
650 TABLET ORAL EVERY 6 HOURS PRN
Status: DISCONTINUED | OUTPATIENT
Start: 2024-04-13 | End: 2024-04-13 | Stop reason: HOSPADM

## 2024-04-13 RX ORDER — OMEPRAZOLE 20 MG/1
20 CAPSULE, DELAYED RELEASE ORAL DAILY
Qty: 30 CAPSULE | Refills: 0 | Status: SHIPPED | OUTPATIENT
Start: 2024-04-13

## 2024-04-13 RX ORDER — SODIUM CHLORIDE 9 MG/ML
INJECTION, SOLUTION INTRAVENOUS CONTINUOUS
Status: DISCONTINUED | OUTPATIENT
Start: 2024-04-13 | End: 2024-04-13 | Stop reason: HOSPADM

## 2024-04-13 RX ORDER — POLYETHYLENE GLYCOL 3350 17 G/17G
1 POWDER, FOR SOLUTION ORAL
Status: DISCONTINUED | OUTPATIENT
Start: 2024-04-13 | End: 2024-04-13 | Stop reason: HOSPADM

## 2024-04-13 RX ORDER — HYDROMORPHONE HYDROCHLORIDE 1 MG/ML
0.25 INJECTION, SOLUTION INTRAMUSCULAR; INTRAVENOUS; SUBCUTANEOUS
Status: DISCONTINUED | OUTPATIENT
Start: 2024-04-13 | End: 2024-04-13 | Stop reason: HOSPADM

## 2024-04-13 RX ORDER — MORPHINE SULFATE 4 MG/ML
1-2 INJECTION INTRAVENOUS
Status: DISCONTINUED | OUTPATIENT
Start: 2024-04-13 | End: 2024-04-13 | Stop reason: HOSPADM

## 2024-04-13 RX ORDER — CAFFEINE CITRATE 20 MG/ML
60 SOLUTION INTRAVENOUS
Status: DISCONTINUED | OUTPATIENT
Start: 2024-04-13 | End: 2024-04-13 | Stop reason: HOSPADM

## 2024-04-13 RX ORDER — NICOTINE 21 MG/24HR
14 PATCH, TRANSDERMAL 24 HOURS TRANSDERMAL
Status: DISCONTINUED | OUTPATIENT
Start: 2024-04-13 | End: 2024-04-13 | Stop reason: HOSPADM

## 2024-04-13 RX ORDER — PROMETHAZINE HYDROCHLORIDE 25 MG/1
12.5-25 SUPPOSITORY RECTAL EVERY 4 HOURS PRN
Status: DISCONTINUED | OUTPATIENT
Start: 2024-04-13 | End: 2024-04-13 | Stop reason: HOSPADM

## 2024-04-13 RX ORDER — ASPIRIN 81 MG/1
81 TABLET ORAL DAILY
Status: DISCONTINUED | OUTPATIENT
Start: 2024-04-13 | End: 2024-04-13 | Stop reason: HOSPADM

## 2024-04-13 RX ORDER — ONDANSETRON 2 MG/ML
4 INJECTION INTRAMUSCULAR; INTRAVENOUS EVERY 4 HOURS PRN
Status: DISCONTINUED | OUTPATIENT
Start: 2024-04-13 | End: 2024-04-13 | Stop reason: HOSPADM

## 2024-04-13 RX ORDER — PROCHLORPERAZINE EDISYLATE 5 MG/ML
5-10 INJECTION INTRAMUSCULAR; INTRAVENOUS EVERY 4 HOURS PRN
Status: DISCONTINUED | OUTPATIENT
Start: 2024-04-13 | End: 2024-04-13 | Stop reason: HOSPADM

## 2024-04-13 RX ORDER — LABETALOL HYDROCHLORIDE 5 MG/ML
10 INJECTION, SOLUTION INTRAVENOUS EVERY 4 HOURS PRN
Status: DISCONTINUED | OUTPATIENT
Start: 2024-04-13 | End: 2024-04-13 | Stop reason: HOSPADM

## 2024-04-13 RX ORDER — OXYCODONE HYDROCHLORIDE 5 MG/1
2.5 TABLET ORAL
Status: DISCONTINUED | OUTPATIENT
Start: 2024-04-13 | End: 2024-04-13 | Stop reason: HOSPADM

## 2024-04-13 RX ORDER — PROMETHAZINE HYDROCHLORIDE 25 MG/1
12.5-25 TABLET ORAL EVERY 4 HOURS PRN
Status: DISCONTINUED | OUTPATIENT
Start: 2024-04-13 | End: 2024-04-13 | Stop reason: HOSPADM

## 2024-04-13 RX ORDER — ALUMINA, MAGNESIA, AND SIMETHICONE 2400; 2400; 240 MG/30ML; MG/30ML; MG/30ML
30 SUSPENSION ORAL EVERY 4 HOURS PRN
Status: DISCONTINUED | OUTPATIENT
Start: 2024-04-13 | End: 2024-04-13 | Stop reason: HOSPADM

## 2024-04-13 RX ORDER — REGADENOSON 0.08 MG/ML
0.4 INJECTION, SOLUTION INTRAVENOUS
Status: DISCONTINUED | OUTPATIENT
Start: 2024-04-13 | End: 2024-04-13 | Stop reason: HOSPADM

## 2024-04-13 RX ORDER — NITROGLYCERIN 0.4 MG/1
0.4 TABLET SUBLINGUAL
Status: DISCONTINUED | OUTPATIENT
Start: 2024-04-13 | End: 2024-04-13 | Stop reason: HOSPADM

## 2024-04-13 RX ORDER — AMOXICILLIN 250 MG
2 CAPSULE ORAL 2 TIMES DAILY
Status: DISCONTINUED | OUTPATIENT
Start: 2024-04-13 | End: 2024-04-13 | Stop reason: HOSPADM

## 2024-04-13 RX ADMIN — ACETAMINOPHEN 650 MG: 325 TABLET ORAL at 13:04

## 2024-04-13 RX ADMIN — ASPIRIN 81 MG: 81 TABLET, COATED ORAL at 12:07

## 2024-04-13 RX ADMIN — SODIUM CHLORIDE: 9 INJECTION, SOLUTION INTRAVENOUS at 12:10

## 2024-04-13 ASSESSMENT — LIFESTYLE VARIABLES
HOW MANY TIMES IN THE PAST YEAR HAVE YOU HAD 5 OR MORE DRINKS IN A DAY: 0
ON A TYPICAL DAY WHEN YOU DRINK ALCOHOL HOW MANY DRINKS DO YOU HAVE: 0
EVER HAD A DRINK FIRST THING IN THE MORNING TO STEADY YOUR NERVES TO GET RID OF A HANGOVER: NO
AVERAGE NUMBER OF DAYS PER WEEK YOU HAVE A DRINK CONTAINING ALCOHOL: 0
HAVE PEOPLE ANNOYED YOU BY CRITICIZING YOUR DRINKING: NO
HAVE YOU EVER FELT YOU SHOULD CUT DOWN ON YOUR DRINKING: NO
EVER FELT BAD OR GUILTY ABOUT YOUR DRINKING: NO
TOTAL SCORE: 0
CONSUMPTION TOTAL: NEGATIVE
TOTAL SCORE: 0
ALCOHOL_USE: NO
TOTAL SCORE: 0

## 2024-04-13 ASSESSMENT — PATIENT HEALTH QUESTIONNAIRE - PHQ9
SUM OF ALL RESPONSES TO PHQ9 QUESTIONS 1 AND 2: 0
2. FEELING DOWN, DEPRESSED, IRRITABLE, OR HOPELESS: NOT AT ALL
1. LITTLE INTEREST OR PLEASURE IN DOING THINGS: NOT AT ALL

## 2024-04-13 ASSESSMENT — COPD QUESTIONNAIRES
DURING THE PAST 4 WEEKS HOW MUCH DID YOU FEEL SHORT OF BREATH: SOME OF THE TIME
HAVE YOU SMOKED AT LEAST 100 CIGARETTES IN YOUR ENTIRE LIFE: YES
DO YOU EVER COUGH UP ANY MUCUS OR PHLEGM?: YES, A FEW DAYS A WEEK OR MONTH
COPD SCREENING SCORE: 5

## 2024-04-13 ASSESSMENT — FIBROSIS 4 INDEX
FIB4 SCORE: 1.01
FIB4 SCORE: 1.01

## 2024-04-13 ASSESSMENT — PAIN DESCRIPTION - DESCRIPTORS: DESCRIPTORS: DULL;SHARP

## 2024-04-13 ASSESSMENT — PAIN DESCRIPTION - PAIN TYPE
TYPE: ACUTE PAIN

## 2024-04-13 NOTE — CARE PLAN
The patient is Watcher - Medium risk of patient condition declining or worsening    Shift Goals  Clinical Goals: Stress test, serial troponins  Patient Goals: Rest, pain control  Family Goals: No family currently present    Progress made toward(s) clinical / shift goals:   Serial troponins per orders, plan for stress test at 1500.     Problem: Pain - Standard  Goal: Alleviation of pain or a reduction in pain to the patient’s comfort goal  Outcome: Progressing  Patient reports 4/10 chest pressure, declines medications.     Problem: Respiratory  Goal: Patient will achieve/maintain optimum respiratory ventilation and gas exchange  Outcome: Progressing  Patient is on RA, denies SOB.

## 2024-04-13 NOTE — ED PROVIDER NOTES
Emergency Physician Note    Chief Concern:  Chief Complaint   Patient presents with    Chest Pain     Pt describes it as tightness in chest x 3 weeks.  He states the pain became very sharp, accompanied with SOB his morning.    Leg Pain     Pt reports intermittent leg cramping, worse on R than L, for months.       HPI/ROS     HPI:  Rhys Canseco is a 58 y.o. male who presents the emergency department today for evaluation of substernal chest pain.  Symptoms began about 3 weeks ago, he describes ongoing tightness waxing and waning, however symptoms did not seem to be severe.  Over the last 3 weeks he has not noticed any exertional component, he has had some associated shortness of breath when the pain gets more severe, though no true dyspnea on exertion, no worsening of pain with exertion.  Last night his pain become much worse, he states this morning when he woke up he had severe pain localized to the epigastric area and substernal area, it somewhat improved at this time, though is not yet resolved.  He reports no cardiopulmonary medical history, though states he does not see a doctor routinely.  He states that his father had undergo triple bypass procedure at his age, and reports that his mother  of lung cancer.  On review of systems, he also reports some pain localized to bilateral lower extremities which has been going on for months, though he states more recently his right leg seems to be a little more painful which he describes as cramps.  He reports no weakness in the bilateral legs, though at times reports some paresthesias.  He reports no upper extremity symptoms.  He has no prior history of DVT or pulmonary embolism.    PAST MEDICAL HISTORY  Past Medical History:   Diagnosis Date    Gallstone        SURGICAL HISTORY  Past Surgical History:   Procedure Laterality Date    APPENDECTOMY      STEPHANI BY LAPAROSCOPY      Cholecystectomy, Laparoscopic       FAMILY HISTORY  Family History   Problem Relation Age  of Onset    Diabetes Mother     Cancer Mother     Heart Disease Father        SOCIAL HISTORY   reports that he has been smoking cigarettes. He has a 20 pack-year smoking history. He has never used smokeless tobacco. He reports current drug use. Drug: Inhaled. He reports that he does not drink alcohol.    CURRENT MEDICATIONS  Previous Medications    MELOXICAM (MOBIC) 7.5 MG TAB    Take 1 Tablet by mouth every day.       ALLERGIES  Bee    PHYSICAL EXAM  Vital Signs: /79   Pulse 97   Temp 36.3 °C (97.4 °F) (Temporal)   Resp (!) 25   Wt 85.5 kg (188 lb 7.9 oz)   SpO2 95%   BMI 29.52 kg/m²   Constitutional: Alert, no acute distress  HENT: Normocephalic, atraumatic.  Cardiovascular: Tachycardic rate in the high 90s, regular rate and rhythm murmur appreciated  Pulmonary: No respiratory distress, normal work of breathing, breath sounds quiet and equal bilaterally  Abdomen: Soft, non tender, no peritoneal signs.  Skin: Warm, dry, no rashes or lesions  Musculoskeletal: No unilateral edema, no calf or posterior thigh tenderness to palpation bilaterally  Neurologic: Alert, oriented, normal motor function, no speech deficits, 5 out of 5 upper and lower extremity strength  Psychiatric: Normal and appropriate mood and affect    Diagnostic Studies & Procedures    Labs:  All labs reviewed by me as noted below.    EK Lead EKG interpreted by me as noted below  Report      [facility] Emergency Dept.    Test Date:  2024  Pt Name:    GUSTAVO RAUSCH                Department: ER  MRN:        [id number]                      Room:  Gender:     Male                         Technician: [id number]  :        1965                   Requested By:ER TRIAGE PROTOCOL  Order #:    [id number]                    Reading MD: MYKE DYKES MD    Measurements  Intervals                                Axis  Rate:       97                           P:          49  NM:         150                          QRS:         -45  QRSD:       93                           T:          59  QT:         346  QTc:        440    Interpretive Statements  Rate 97, sinus rhythm, no ST elevation or depression, no pathologic T wave  inversions, no ectopy.  Electronically Signed On 04- 09:23:39 PDT by MYKE DYKES MD       Radiology:  The attending Emergency Physician has independently interpreted the following imaging:  I independently interpreted the chest x-ray, no pulmonary edema nor pleural effusions identified.    DX-CHEST-PORTABLE (1 VIEW)   Final Result      Mild bibasilar atelectasis. No significant consolidation or pleural effusion.          Course and Medical Decision Making    Initial Assessment and Plan:  Mr. Canseco presents to the emergency department today for evaluation of substernal chest pain.  Symptoms been ongoing for about 3 weeks, significantly worsening over the last 24 hours.  He does not report a history of hyperlipidemia nor diabetes, but does not follow with a physician for routine health maintenance.  Cardiac risk factors include family history, and history of smoking.  Chest pain is not severe on arrival to the emergency department.    EKG does not show any evidence of acute ST elevation MI.  Screening lab work is reassuring with a normal white blood count.  He is afebrile, no evidence of infectious etiology with regard to lab work or vital signs.  Initial troponin resulted negative, proBNP resulted negative.    Due to reported leg pain and cramps, I did consider pulmonary embolism, screening D-dimer is negative, bilateral lower extremity ultrasound is negative, patient is low pretest probability, do not believe escalation of care to include CT of the chest is necessary at this time.  He has no thoracic back pain, doubt aortic etiology.    Due to progressively worsening symptoms of chest pain with no history of routine health maintenance, plan at this time is for admission to hospitalist service for further  cardiac evaluation, as well as cardiac risk stratification, and identification of any risk factors that may be mitigated by addition or change in medication.    Measures:  HEART Score: 4    Additional Problems and Disposition    Escalation of care considered:  CT of the chest was considered, based on negative D-dimer and negative bilateral lower extremity ultrasound do not believe this is necessary.    I have discussed management of the patient with the following physicians:   Dr. Butcher, Hospitalist    Disposition:  Admit in stable condition    FINAL IMPRESSION   1. Chest pain, unspecified type  omeprazole (PRILOSEC) 20 MG delayed-release capsule      2. Pain in both lower extremities

## 2024-04-13 NOTE — H&P
Hospital Medicine History & Physical Note    Date of Service  4/13/2024    Primary Care Physician  Pcp Pt States None    Consultants  None    Code Status  Full Code    Chief Complaint  Chief Complaint   Patient presents with    Chest Pain     Pt describes it as tightness in chest x 3 weeks.  He states the pain became very sharp, accompanied with SOB his morning.    Leg Pain     Pt reports intermittent leg cramping, worse on R than L, for months.         History of Presenting Illness  Rhys Canseco is a 58 y.o. male without much past medical history however with ongoing tobacco dependence and cannabinoid use, who presented 4/13/2024 with 3 weeks of intermittent pressure-like lower chest/epigastric pain with no obvious precipitating factor although he states occurs more when he is lying down for a long time.  Episodes were not associated with eating or exertion.  He denies any nausea, vomiting, diaphoresis, leg edema, bowel movement changes, or oral brash, but lately episodes have been associated with shortness of breath.  He has been frequent having leg cramping.  Last night, he was awakened by severe chest pain which prompted him to go to the ED today.    ED course:  On evaluation, vital signs were stable.  He was not hypoxic.  Initial blood workup showed no leukocytosis.  Sodium was 133.  Renal function was normal.  Troponin was negative and BNP was low.  D-dimer was negative.  Chest x-ray (personally reviewed) only showed mild bibasilar atelectasis with no consolidation or effusion.  EKG (my read) did not show any dynamic ischemic changes.  Lower extremity ultrasound did not show any DVT.  Patient subsequently admitted to the hospitalist service.    I personally reviewed all lab results mentioned above. Prior medical records from this institution and outside facilities were independently reviewed as noted. I also personally reviewed all ER physician and consultant recommendations and plans as documented above.  History was independently obtained by myself. I discussed the plan of care with patient, bedside RN, charge RN, , and ERP .    Review of Systems  ROS    Pertinent positives/negatives as mentioned above.     A complete review of systems was personally done by me. All other systems were negative.       Past Medical History   has a past medical history of Gallstone.    Surgical History   has a past surgical history that includes appendectomy and sonia by laparoscopy.     Family History  family history includes Cancer in his mother; Diabetes in his mother; Heart Disease in his father.     Social History   reports that he has been smoking cigarettes. He has a 20 pack-year smoking history. He has never used smokeless tobacco. He reports current drug use. Drug: Inhaled. He reports that he does not drink alcohol.    Allergies  Allergies   Allergen Reactions    Bee        Medications  None       Physical Exam  Temp:  [36.3 °C (97.4 °F)] 36.3 °C (97.4 °F)  Pulse:  [] 89  Resp:  [20-25] 25  BP: (106-124)/(68-79) 117/68  SpO2:  [93 %-98 %] 93 %  Blood Pressure: 117/68   Temperature: 36.3 °C (97.4 °F)   Pulse: 89   Respiration: (!) 25   Pulse Oximetry: 93 %       Physical Exam  Vitals reviewed.   Constitutional:       General: He is not in acute distress.     Appearance: Normal appearance. He is not toxic-appearing or diaphoretic.   HENT:      Head: Normocephalic and atraumatic.      Right Ear: External ear normal.      Left Ear: External ear normal.      Mouth/Throat:      Mouth: Mucous membranes are moist.      Pharynx: No oropharyngeal exudate.   Eyes:      General: No scleral icterus.     Extraocular Movements: Extraocular movements intact.      Conjunctiva/sclera: Conjunctivae normal.      Pupils: Pupils are equal, round, and reactive to light.   Cardiovascular:      Rate and Rhythm: Normal rate and regular rhythm.      Heart sounds: Normal heart sounds. No murmur heard.     No gallop.   Pulmonary:       Effort: Pulmonary effort is normal. No respiratory distress.      Breath sounds: Normal breath sounds. No stridor. No wheezing, rhonchi or rales.   Chest:      Chest wall: No tenderness.   Abdominal:      General: Bowel sounds are normal. There is no distension.      Palpations: Abdomen is soft. There is no mass.      Tenderness: There is abdominal tenderness (Minimal tenderness on the epigastric area). There is no guarding or rebound.   Musculoskeletal:         General: No swelling. Normal range of motion.      Cervical back: Normal range of motion and neck supple.      Right lower leg: No edema.      Left lower leg: No edema.   Lymphadenopathy:      Cervical: No cervical adenopathy.   Skin:     General: Skin is warm and dry.      Coloration: Skin is not jaundiced.      Findings: No rash.   Neurological:      General: No focal deficit present.      Mental Status: He is alert and oriented to person, place, and time.      Cranial Nerves: No cranial nerve deficit.   Psychiatric:         Mood and Affect: Mood normal.         Behavior: Behavior normal.         Thought Content: Thought content normal.         Judgment: Judgment normal.         Laboratory:  Recent Labs     04/13/24  0854   WBC 7.3   RBC 5.39   HEMOGLOBIN 16.5   HEMATOCRIT 49.8   MCV 92.4   MCH 30.6   MCHC 33.1   RDW 44.2   PLATELETCT 231   MPV 9.7     Recent Labs     04/13/24  0854   SODIUM 133*   POTASSIUM 4.4   CHLORIDE 100   CO2 22   GLUCOSE 102*   BUN 11   CREATININE 1.12   CALCIUM 8.9     Recent Labs     04/13/24  0854   ALTSGPT 30   ASTSGOT 22   ALKPHOSPHAT 124*   TBILIRUBIN 0.5   GLUCOSE 102*         Recent Labs     04/13/24  0854   NTPROBNP 52         Recent Labs     04/13/24  0854   TROPONINT 7       Imaging:  US-EXTREMITY VENOUS LOWER BILAT         DX-CHEST-PORTABLE (1 VIEW)   Final Result      Mild bibasilar atelectasis. No significant consolidation or pleural effusion.      EC-ECHOCARDIOGRAM COMPLETE W/O CONT    (Results Pending)   NM-CARDIAC  STRESS TEST    (Results Pending)         Imaging studies and EKG results were independently reviewed as above.      Assessment/Plan:  Justification for Admission Status  I anticipate this patient is appropriate for observation status at this time because chest pain requiring further cardiac workup.    Patient will need a Telemetry bed on MEDICAL service .  The need is secondary to chest pain.    * Chest pain- (present on admission)  Assessment & Plan  The ASCVD Risk score (Jb DK, et al., 2019) failed to calculate for the following reasons:    Cannot find a previous HDL lab    Cannot find a previous total cholesterol lab    -No evidence of ACS.  Troponin negative and EKG showed no dynamic ischemic changes.  D-dimer is negative.  Could be GI related.  However has strong family cardiac history.  Reasonable to pursue further noninvasive cardiac workup.  -Start empiric aspirin for now until cardiac cause ruled out.  Check lipid profile and hemoglobin A1c for further risk stratification. We will do further cardiac monitoring to rule out arrhythmias.  -I will obtain serial troponins, and an echocardiogram.  If troponins remain negative, would proceed with nuclear cardiac stress test.  -Start as needed sublingual nitroglycerin, and morphine for pain recurrence.   -For completion, I will obtain a urine drug screen.  -For possibility of GI related chest pain, I will start him on a trial of IV famotidine twice daily along with PRN GI cocktail, and Maalox.       Hyponatremia- (present on admission)  Assessment & Plan  - Mild.  Start NS at 83 cc/h.  Follow sodium levels.  BMP in the morning.    Tobacco dependence- (present on admission)  Assessment & Plan  - Rhys Canseco presents with chest pain.  The patient continues to smoke 1/2 pack of cigarettes per day.  He has tried to quit before. I spent 12 minutes counseling the patient on cessation techniques and resources were offered including nicotine patches, gum, along  with medical treatment with wellbutrin and chantix. The patient understands continuing to smoke could lead to complications such as lung disease, heart attack, stroke and death.  The benefits of stopping were also presented to him. The patient verbalized understanding. CPT CODE: 14150 (greater than 10 minutes tobacco counseling).  - Will start on nicotine replacement therapy while in-house.        VTE prophylaxis: enoxaparin ppx

## 2024-04-13 NOTE — PROGRESS NOTES
4 Eyes Skin Assessment Completed by MENDY Junior and MENDY Coy.    Head WDL  Ears WDL  Nose WDL  Mouth WDL  Neck WDL  Breast/Chest WDL  Shoulder Blades WDL  Spine WDL  (R) Arm/Elbow/Hand WDL  (L) Arm/Elbow/Hand WDL  Abdomen WDL  Groin WDL  Scrotum/Coccyx/Buttocks WDL  (R) Leg Scab, small scabs on shin  (L) Leg Scab, small scabs on shin  (R) Heel/Foot/Toe Dryness  (L) Heel/Foot/Toe Dryness, callus on left great toe          Devices In Places Tele Box and Pulse Ox      Interventions In Place Pressure Redistribution Mattress    Possible Skin Injury No    Pictures Uploaded Into Epic N/A  Wound Consult Placed N/A  RN Wound Prevention Protocol Ordered No

## 2024-04-13 NOTE — DISCHARGE SUMMARY
Discharge Summary    CHIEF COMPLAINT ON ADMISSION  Chief Complaint   Patient presents with    Chest Pain     Pt describes it as tightness in chest x 3 weeks.  He states the pain became very sharp, accompanied with SOB his morning.    Leg Pain     Pt reports intermittent leg cramping, worse on R than L, for months.         Reason for Admission  Chest pain     Admission Date  4/13/2024    CODE STATUS  Full Code    HPI & HOSPITAL COURSE  Rhys Canseco is a 58 y.o. male without much past medical history however with ongoing tobacco dependence and cannabinoid use, who presented 4/13/2024 with 3 weeks of intermittent pressure-like lower chest/epigastric pain with no obvious precipitating factor although he states occurs more when he is lying down for a long time.  Episodes were not associated with eating or exertion.  He denies any nausea, vomiting, diaphoresis, leg edema, bowel movement changes, or oral brash, but lately episodes have been associated with shortness of breath.  He has been frequent having leg cramping.  Last night, he was awakened by severe chest pain which prompted him to go to the ED today.     On evaluation, vital signs were stable.  He was not hypoxic.  Initial blood workup showed no leukocytosis.  Sodium was 133.  Renal function was normal.  Troponin was negative and BNP was low.  D-dimer was negative.  Chest x-ray only showed mild bibasilar atelectasis with no consolidation or effusion.  EKG did not show any dynamic ischemic changes.  Lower extremity ultrasound did not show any DVT.  Further cardiac workup was pursued.  Troponins remain negative.  He maintained sinus rhythm on telemetry.  Echocardiogram showed normal LV and RV systolic function, EF 55%, no significant valvular disease, and normal regional wall motions. However his urine drug screen came back positive for amphetamines and cannabinoid, and he admitted to using methamphetamine 2 days ago.  He was not able to be stress tested because  of positive methamphetamine.  He is counseled to pursue outpatient stress testing.  He is referred to establish care with an outpatient PCP.  He is placed on a trial of EPI.    He remained hemodynamically stable and afebrile. I have personally seen and examined the patient on the day of discharge. He was deemed ready to discharge from the hospital as he did not have any further hospitalization needs. Patient felt comfortable going home. The discharge plan was discussed with the patient, with which he was agreeable to.     Therefore, he is discharged in good and stable condition to home with close outpatient follow-up.      Discharge Date  4/13/2024      FOLLOW UP ITEMS POST DISCHARGE  -Continue trial of Prilosec 20 mg daily.  -He is referred to establish care with PCP to pursue outpatient stress testing.  - counseled to seek immediate medical attention, or return to the ED for recurrent or worsening symptoms.      DISCHARGE DIAGNOSES  Principal Problem:    Chest pain (POA: Yes)  Active Problems:    Hyponatremia (POA: Yes)    Methamphetamine use (HCC) (POA: Yes)    Tobacco dependence (POA: Yes)  Resolved Problems:    * No resolved hospital problems. *      FOLLOW UP  No future appointments.  No follow-up provider specified.    MEDICATIONS ON DISCHARGE     Medication List        START taking these medications        Instructions   omeprazole 20 MG delayed-release capsule  Commonly known as: PriLOSEC   Take 1 Capsule by mouth every day.  Dose: 20 mg              Allergies  Allergies   Allergen Reactions    Bee        DIET  Orders Placed This Encounter   Procedures    Diet Order Diet: Cardiac; Miscellaneous modifications: (optional): No Decaf, No Caffeine(for test)     Standing Status:   Standing     Number of Occurrences:   1     Order Specific Question:   Diet:     Answer:   Cardiac [6]     Order Specific Question:   Miscellaneous modifications: (optional)     Answer:   No Decaf, No Caffeine(for test) [11]        ACTIVITY  As tolerated.  Weight bearing as tolerated    CONSULTATIONS  None    PROCEDURES  None    LABORATORY  Lab Results   Component Value Date    SODIUM 133 (L) 04/13/2024    POTASSIUM 4.4 04/13/2024    CHLORIDE 100 04/13/2024    CO2 22 04/13/2024    GLUCOSE 102 (H) 04/13/2024    BUN 11 04/13/2024    CREATININE 1.12 04/13/2024    CREATININE 1.1 01/03/2008        Lab Results   Component Value Date    WBC 7.3 04/13/2024    HEMOGLOBIN 16.5 04/13/2024    HEMATOCRIT 49.8 04/13/2024    PLATELETCT 231 04/13/2024        Total time of the discharge process = 40 minutes.

## 2024-04-13 NOTE — PROGRESS NOTES
Patient arrived on unit from ED. Tele box in place. Patient oriented to room and call light system. Admit profile complete, 2 RN skin check complete. Patient ambulates with standby assist. Patient declines medication but reports 4/10 chest pressure that is similar to what he has been having in the ED. Patient updated on POC for stress test at 1500.

## 2024-04-13 NOTE — PROGRESS NOTES
Discharge orders received. Patient updated on discharge lounge process. Tele box removed, monitor room notified.

## 2024-04-13 NOTE — PROGRESS NOTES
UDS came back positive for amphetamines, Dr. Butcher updated.     Stress test cannot be completed until UDS is negative for amphetamines.

## 2024-04-13 NOTE — ED TRIAGE NOTES
Chief Complaint   Patient presents with    Chest Pain     Pt describes it as tightness in chest x 3 weeks.  He states the pain became very sharp, accompanied with SOB his morning.    Leg Pain     Pt reports intermittent leg cramping, worse on R than L, for months.       R:  115./79, 106  L: 100/72, 99    EKG completed.  CP protocol initiated.

## 2024-04-13 NOTE — ASSESSMENT & PLAN NOTE
- Rhys Canseco presents with chest pain.  The patient continues to smoke 1/2 pack of cigarettes per day.  He has tried to quit before. I spent 12 minutes counseling the patient on cessation techniques and resources were offered including nicotine patches, gum, along with medical treatment with wellbutrin and chantix. The patient understands continuing to smoke could lead to complications such as lung disease, heart attack, stroke and death.  The benefits of stopping were also presented to him. The patient verbalized understanding. CPT CODE: 15829 (greater than 10 minutes tobacco counseling).  - Will start on nicotine replacement therapy while in-house.

## 2024-04-13 NOTE — ED NOTES
Med rec completed per patient at bedside.  Allergies reviewed.    Patient denies using any prescription or over-the-counter medications.    Outpatient antibiotics within the last 30 days: none.    ANTICOAGULATION: none.    Patient's preferred pharmacy: [facility] on [address]

## 2024-04-13 NOTE — ASSESSMENT & PLAN NOTE
The ASCVD Risk score (Jb DAVIDSON, et al., 2019) failed to calculate for the following reasons:    Cannot find a previous HDL lab    Cannot find a previous total cholesterol lab    -No evidence of ACS.  Troponin negative and EKG showed no dynamic ischemic changes.  D-dimer is negative.  Could be GI related.  However has strong family cardiac history.  Reasonable to pursue further noninvasive cardiac workup.  -Start empiric aspirin for now until cardiac cause ruled out.  Check lipid profile and hemoglobin A1c for further risk stratification. We will do further cardiac monitoring to rule out arrhythmias.  -I will obtain serial troponins, and an echocardiogram.  If troponins remain negative, would proceed with nuclear cardiac stress test.  -Start as needed sublingual nitroglycerin, and morphine for pain recurrence.   -For completion, I will obtain a urine drug screen.  -For possibility of GI related chest pain, I will start him on a trial of IV famotidine twice daily along with PRN GI cocktail, and Maalox.

## 2024-04-14 LAB — EKG IMPRESSION: NORMAL

## 2024-04-14 PROCEDURE — 93010 ELECTROCARDIOGRAM REPORT: CPT | Performed by: STUDENT IN AN ORGANIZED HEALTH CARE EDUCATION/TRAINING PROGRAM

## 2024-04-14 NOTE — PROGRESS NOTES
PIV removed with tip intact and site covered with gauze and coban. Armband removed. Discussed dc instructions including medications, and follow-up appointments.

## 2024-04-15 ASSESSMENT — HEART SCORE
HISTORY: MODERATELY SUSPICIOUS
TROPONIN: LESS THAN OR EQUAL TO NORMAL LIMIT
RISK FACTORS: 1-2 RISK FACTORS
ECG: NON-SPECIFIC REPOLARIZATION DISTURBANCE
AGE: 45-64
HEART SCORE: 4